# Patient Record
Sex: MALE | Race: ASIAN | NOT HISPANIC OR LATINO | URBAN - METROPOLITAN AREA
[De-identification: names, ages, dates, MRNs, and addresses within clinical notes are randomized per-mention and may not be internally consistent; named-entity substitution may affect disease eponyms.]

---

## 2019-02-22 ENCOUNTER — EMERGENCY (EMERGENCY)
Facility: HOSPITAL | Age: 19
LOS: 1 days | Discharge: HOME | End: 2019-02-22
Admitting: EMERGENCY MEDICINE
Payer: SELF-PAY

## 2019-02-22 VITALS
HEART RATE: 88 BPM | OXYGEN SATURATION: 96 % | SYSTOLIC BLOOD PRESSURE: 134 MMHG | DIASTOLIC BLOOD PRESSURE: 68 MMHG | RESPIRATION RATE: 16 BRPM | TEMPERATURE: 99 F

## 2019-02-22 DIAGNOSIS — F43.22 ADJUSTMENT DISORDER WITH ANXIETY: ICD-10-CM

## 2019-02-22 PROCEDURE — 90792 PSYCH DIAG EVAL W/MED SRVCS: CPT | Mod: GC

## 2019-02-22 PROCEDURE — 99283 EMERGENCY DEPT VISIT LOW MDM: CPT

## 2019-02-22 NOTE — ED BEHAVIORAL HEALTH ASSESSMENT NOTE - RISK ASSESSMENT
patient poses low acute and low chronic risk for suicide or self harm, he has no hx of hospitalizations, SA, no tx hx, no family hx of mental illness or suicide, currently abstaining from drugs/alcohol, he is forward thinking (looking for job, excited to go home to [facility] to visit in summer), has supportive friends, reconciled with family, given few acute or chronic risk factors and abundant protective factors, patient determined to pose minimal risk and treatment can be most effectively done as outpatient at his school.

## 2019-02-22 NOTE — ED PROVIDER NOTE - CLINICAL SUMMARY MEDICAL DECISION MAKING FREE TEXT BOX
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 There is no clinical evidence of intoxication or any acute medical problem requiring immediate intervention. Patient is awaiting psychiatric consultation. Final disposition will be determined by psychiatrist. Recommend following up [facility].

## 2019-02-22 NOTE — ED BEHAVIORAL HEALTH ASSESSMENT NOTE - SUMMARY
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 Patient does not meet criteria for mood disorder or psychotic disorder, he is actually doing better now than he has been in a number of months. He denies any passive SI, poses a very low safety risk, collateral obtained supports his description of events. No indication for hospitalization.   Plan: treat and release, patient instructed to go to [facility] for f/u with therapist.  -discussed safety planning with patient, who acknowledged he could come to ED or call 911 if he felt he was a danger to self or others.
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
## 2019-02-22 NOTE — ED BEHAVIORAL HEALTH ASSESSMENT NOTE - SUICIDE PROTECTIVE FACTORS
Ability to cope with stress/High frustration tolerance/Identifies reasons for living/Future oriented/Engaged in work or school/Responsibility to family and others
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
## 2019-02-22 NOTE — ED BEHAVIORAL HEALTH NOTE - BEHAVIORAL HEALTH NOTE
Worker met with patient in the ed waiting room upon discharge.  Patient had questions regarding applying for Medicaid. Worker explained that there is a  on site and he can come back between the hours of 9am -5pm on Monday through Friday to meet with him. Worker provided also information to apply for Medicaid online.

## 2019-02-22 NOTE — ED ADULT TRIAGE NOTE - CHIEF COMPLAINT QUOTE
reports " wants to hurt self" feeling x 2 wks. [facility] d'[facility]. panic attacks

## 2019-02-22 NOTE — ED ADULT NURSE NOTE - OBJECTIVE STATEMENT
Break Coverage RN: Patient is a 17 y/o male a&ox4 p/w a c/c of depression and suicidal ideation.  Patient reports he made a passive comment to someone in the hospital lobby about suicide and was escorted to the ED for evaluation.  Presently endorsing passive SI, denying intent and plan.  Patient makes poor eye contact throughout interview, endorsing depressed mood.  NP Kael advised of patient's SI, no CO indicated at this time as per NP.  Patient denies medical complaints.  Patient reports recent hospitalization at [facility] for anxiety, denies taking any medication currently.

## 2019-02-22 NOTE — ED BEHAVIORAL HEALTH ASSESSMENT NOTE - SAFETY PLAN DETAILS
Return to emergency room or call 911 if you are feeling suicidal or if you feel you are a threat to yourself or others.

## 2019-02-22 NOTE — ED PROVIDER NOTE - OBJECTIVE STATEMENT
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  Denies active plan. Admits to  multiple social stressors   Denies homicidal ideations. Denies Auditory/Visual hallucinations. Denies pain, SOB, fever, chills, chest/abdominal discomfort. Denies falling, punching or kicking any objects.  Denies recent use of alcohol or illicit drugs..

## 2019-02-22 NOTE — ED BEHAVIORAL HEALTH ASSESSMENT NOTE - CASE SUMMARY
18M with depression though no formal treatment, presents as a walk in after making a comment about depression and suicide to a staff member while in the hospital cafe. Patient admits to social stressors with friends and has felt intermittently depressed and occasionally has fleeting passive thoughts of death, never active suicidal ideation, never a plan or intent, no prior attempts or self injury. He currently does not meet criteria for a major depressive episode, is otherwise doing well in school. Spoke with friend, as pts parents are currently out of the country, friend expressed no safety concerns. No indication for admission. Patient encouraged to follow up at school counseling center or come back to the ED if he feels worse, verbalized understanding.

## 2019-02-22 NOTE — ED BEHAVIORAL HEALTH ASSESSMENT NOTE - DESCRIPTION
Calm cooperative    Vital Signs Last 24 Hrs  T(C): 37.1 (22 Feb 2019 18:09), Max: 37.1 (22 Feb 2019 18:09)  T(F): 98.7 (22 Feb 2019 18:09), Max: 98.7 (22 Feb 2019 18:09)  HR: 88 (22 Feb 2019 18:09) (88 - 88)  BP: 134/68 (22 Feb 2019 18:09) (134/68 - 134/68)  BP(mean): --  RR: 16 (22 Feb 2019 18:09) (16 - 16)  SpO2: 96% (22 Feb 2019 18:09) (96% - 96%) na Freshman at [employer], from [employer]

## 2019-02-22 NOTE — ED PROVIDER NOTE - NSFOLLOWUPCLINICS_GEN_ALL_ED_FT
[facility]  Behavioral Health  [id number] [place] [address]  Phone: [phone]  Fax:   Follow Up Time:

## 2019-02-22 NOTE — ED ADULT NURSE NOTE - CHIEF COMPLAINT QUOTE
reports " wants to hurt self" feeling x 2 wks. \[facility] [place] [facility]\"" d'[facility]. panic attacks

## 2024-01-08 NOTE — ED PROVIDER NOTE - CROS ED ROS STATEMENT
Addended by: FRANSISCA DAVIS on: 1/8/2024 11:27 AM     Modules accepted: Orders     all other ROS negative except as per HPI

## 2025-01-29 NOTE — ED BEHAVIORAL HEALTH ASSESSMENT NOTE - HOMICIDALITY / AGGRESSION (CURRENT/PAST)
Prep called to the floor.  Request NPO of food for 2 hours before exam, may have all meds and water up to exam time.  Needs functioning 18 teri in either AC.  Message to be given to Marcelina PETERSEN RN.    Mi Mcgill RN   None known in lifetime
